# Patient Record
Sex: MALE | Race: WHITE | NOT HISPANIC OR LATINO | Employment: FULL TIME | URBAN - METROPOLITAN AREA
[De-identification: names, ages, dates, MRNs, and addresses within clinical notes are randomized per-mention and may not be internally consistent; named-entity substitution may affect disease eponyms.]

---

## 2023-12-20 ENCOUNTER — LAB (OUTPATIENT)
Dept: LAB | Facility: LAB | Age: 63
End: 2023-12-20
Payer: COMMERCIAL

## 2023-12-20 DIAGNOSIS — R73.09 OTHER ABNORMAL GLUCOSE: ICD-10-CM

## 2023-12-20 DIAGNOSIS — Z12.5 ENCOUNTER FOR SCREENING FOR MALIGNANT NEOPLASM OF PROSTATE: ICD-10-CM

## 2023-12-20 DIAGNOSIS — I10 ESSENTIAL (PRIMARY) HYPERTENSION: Primary | ICD-10-CM

## 2023-12-20 DIAGNOSIS — E55.9 VITAMIN D DEFICIENCY, UNSPECIFIED: ICD-10-CM

## 2023-12-20 DIAGNOSIS — E78.5 HYPERLIPIDEMIA, UNSPECIFIED: ICD-10-CM

## 2023-12-20 LAB
25(OH)D3 SERPL-MCNC: 18 NG/ML (ref 30–100)
ALBUMIN SERPL BCP-MCNC: 3.9 G/DL (ref 3.4–5)
ALP SERPL-CCNC: 27 U/L (ref 33–136)
ALT SERPL W P-5'-P-CCNC: 18 U/L (ref 10–52)
ANION GAP SERPL CALC-SCNC: 10 MMOL/L (ref 10–20)
AST SERPL W P-5'-P-CCNC: 22 U/L (ref 9–39)
BASOPHILS # BLD AUTO: 0.04 X10*3/UL (ref 0–0.1)
BASOPHILS NFR BLD AUTO: 0.7 %
BILIRUB DIRECT SERPL-MCNC: 0.1 MG/DL (ref 0–0.3)
BILIRUB SERPL-MCNC: 0.5 MG/DL (ref 0–1.2)
BUN SERPL-MCNC: 21 MG/DL (ref 6–23)
CALCIUM SERPL-MCNC: 9.1 MG/DL (ref 8.6–10.3)
CHLORIDE SERPL-SCNC: 101 MMOL/L (ref 98–107)
CO2 SERPL-SCNC: 31 MMOL/L (ref 21–32)
CREAT SERPL-MCNC: 0.97 MG/DL (ref 0.5–1.3)
EOSINOPHIL # BLD AUTO: 0.21 X10*3/UL (ref 0–0.7)
EOSINOPHIL NFR BLD AUTO: 3.7 %
ERYTHROCYTE [DISTWIDTH] IN BLOOD BY AUTOMATED COUNT: 14 % (ref 11.5–14.5)
EST. AVERAGE GLUCOSE BLD GHB EST-MCNC: 120 MG/DL
GFR SERPL CREATININE-BSD FRML MDRD: 88 ML/MIN/1.73M*2
GLUCOSE SERPL-MCNC: 96 MG/DL (ref 74–99)
HBA1C MFR BLD: 5.8 %
HCT VFR BLD AUTO: 45.9 % (ref 41–52)
HGB BLD-MCNC: 15.1 G/DL (ref 13.5–17.5)
IMM GRANULOCYTES # BLD AUTO: 0.01 X10*3/UL (ref 0–0.7)
IMM GRANULOCYTES NFR BLD AUTO: 0.2 % (ref 0–0.9)
LYMPHOCYTES # BLD AUTO: 2.63 X10*3/UL (ref 1.2–4.8)
LYMPHOCYTES NFR BLD AUTO: 46 %
MCH RBC QN AUTO: 29.4 PG (ref 26–34)
MCHC RBC AUTO-ENTMCNC: 32.9 G/DL (ref 32–36)
MCV RBC AUTO: 89 FL (ref 80–100)
MONOCYTES # BLD AUTO: 0.65 X10*3/UL (ref 0.1–1)
MONOCYTES NFR BLD AUTO: 11.4 %
NEUTROPHILS # BLD AUTO: 2.18 X10*3/UL (ref 1.2–7.7)
NEUTROPHILS NFR BLD AUTO: 38 %
NRBC BLD-RTO: 0 /100 WBCS (ref 0–0)
PLATELET # BLD AUTO: 289 X10*3/UL (ref 150–450)
POTASSIUM SERPL-SCNC: 4.3 MMOL/L (ref 3.5–5.3)
PROT SERPL-MCNC: 7.5 G/DL (ref 6.4–8.2)
PSA SERPL-MCNC: 0.3 NG/ML
RBC # BLD AUTO: 5.14 X10*6/UL (ref 4.5–5.9)
SODIUM SERPL-SCNC: 138 MMOL/L (ref 136–145)
T3 SERPL-MCNC: 120 NG/DL (ref 60–200)
T4 SERPL-MCNC: 5.8 UG/DL (ref 4.5–11.1)
TSH SERPL-ACNC: 4.12 MIU/L (ref 0.44–3.98)
WBC # BLD AUTO: 5.7 X10*3/UL (ref 4.4–11.3)

## 2023-12-20 PROCEDURE — 82306 VITAMIN D 25 HYDROXY: CPT

## 2023-12-20 PROCEDURE — 83036 HEMOGLOBIN GLYCOSYLATED A1C: CPT

## 2023-12-20 PROCEDURE — 85025 COMPLETE CBC W/AUTO DIFF WBC: CPT

## 2023-12-20 PROCEDURE — 36415 COLL VENOUS BLD VENIPUNCTURE: CPT

## 2023-12-20 PROCEDURE — 84436 ASSAY OF TOTAL THYROXINE: CPT

## 2023-12-20 PROCEDURE — 80053 COMPREHEN METABOLIC PANEL: CPT

## 2023-12-20 PROCEDURE — 84153 ASSAY OF PSA TOTAL: CPT

## 2023-12-20 PROCEDURE — 82248 BILIRUBIN DIRECT: CPT

## 2023-12-20 PROCEDURE — 84480 ASSAY TRIIODOTHYRONINE (T3): CPT

## 2023-12-20 PROCEDURE — 84443 ASSAY THYROID STIM HORMONE: CPT

## 2023-12-20 NOTE — PROGRESS NOTES
History Of Present Illness  The patient is a 63-year-old male who complains of a 5-month history of right inguinal pain and a 3-month history of a right inguinal lump..  He has discomfort with activity.  No prior hernia.  He cannot recall any injury.  No prior abdominal operation.    Past medical history:  Diverticulitis  Bilateral knee arthroscopy  Ankle operation     Past Medical History  He has no past medical history on file.    Surgical History  He has a past surgical history that includes XR knee; XR ankle; and Cyst Removal.     Allergies  Patient has no known allergies.    Social History  He reports that he has quit smoking. His smoking use included cigarettes. He uses smokeless tobacco. He reports that he does not currently use alcohol. He reports that he does not use drugs.    Family History  No family history on file.    Review of Systems  Review of Systems:  Constitutional:  no fever, no chills, no significant weight change  Neurological: No history of CVA or seizure disorder  Eyes: No pain, no recent visual change  ENT:  No recent hearing loss  Neck: No pain  Cardiovascular: No chest pain, no history of cardiac disease such as myocardial infarction or arrhythmia or congestive heart failure  Pulmonary: No shortness of breath, no history of pulmonary disease such as pneumonia or COPD  Breast: No history of breast disease or breast mass  Gastrointestinal:   no abdominal pain, no nausea or vomiting, no constipation or diarrhea or blood in the stool.  No history of ulcers.  No liver, gallbladder or pancreas disease.  No intestinal disorder.  Genitourinary: No hematuria or dysuria, no kidney disease  Musculoskeletal: Arthritis with pain of the shoulders, hands and legs  Integumentary:  no rash  Psychiatric:  No anxiety or depression  Endocrine:  no history of diabetes  Hematologic/Lymphatic: No easy bruising or bleeding          Last Recorded Vitals  Blood pressure 122/74, pulse 63, temperature 36.7 °C (98  "°F), temperature source Temporal, resp. rate 16, height 1.702 m (5' 7\"), weight 118 kg (259 lb 12.8 oz), SpO2 97 %.    Physical Exam  Constitutional: Well-developed, well-nourished, alert and oriented, no acute distress  Skin: Warm and dry, no lesions, no rashes, no jaundice  HEENT: Normocephalic, atraumatic, EOMI, no scleral icterus, eyes have no redness or swelling or discharge, external inspection of ears and nose is normal, mucous membranes moist  Neck: Soft, nontender, no mass or adenopathy  Cardiac: Regular rate and rhythm, no murmur  Chest: Patent airway, clear to auscultation, normal breath sounds with good chest expansion, no wheezes or rales or rhonchi noted, thorax symmetric  Abdomen: Nondistended, positive bowel sounds, soft, nontender, no mass  Right inguinal hernia extending into the scrotum.  Reducible.  No left inguinal or femoral hernias palpable.  Rectal: Not performed  Extremities: No injury, no lower extremity edema or calf tenderness  Lymphatic: No cervical adenopathy  Musculoskeletal: Range of motion intact, no joint swelling, normal strength  Neurological: Alert and oriented x3, intact sensory and motor function, no obvious focal neurologic abnormalities, normal gait  Psychological: Appropriate mood and behavior    Relevant Results  Labs from December 20, 2023: WBC 5.7, hemoglobin 15.1, platelet 289.  CMP unremarkable.     Assessment/Plan   Diagnoses and all orders for this visit:  Right inguinal hernia  -     Case Request Operating Room: Robotic assisted laparoscopic right inguinal herniorrhaphy with mesh.  Possible bilateral repair.  Possible open operation.; Standing  BMI 40.0-44.9, adult (CMS/HCC)  Other orders  -     Place in outpatient/hospital ambulatory surgery; Standing  -     Vital Signs; Standing  -     Pulse oximetry, spot; Standing  -     Apply sequential compression device; Standing  -     Insert peripheral IV; Standing  -     Saline lock IV; Standing  -     lactated Ringer's " infusion  -     Full code; Standing  -     ceFAZolin (Ancef) 2 g in dextrose 5 % in water (D5W) 50 mL IV      Symptomatic, but reducible right inguinal hernia.  Recommend right inguinal herniorrhaphy with mesh either open or robotic assisted laparoscopic.  I discussed the procedure and risks with the patient. The risks include bleeding, infection, mesh infection, recurrence, injury to surrounding structures such as nerves/blood vessels/intestine/bladder, chronic postop pain, cardiac/pulmonary complications.   If the mesh becomes infected it could require excision.  I discussed the alternative of hernia repair without mesh and observation.  The patient agrees to have robotic assisted laparoscopic right inguinal herniorrhaphy with mesh with possible bilateral repair and possible open operation.  His BMI is over 40.         Sean Vazquez MD

## 2023-12-22 ENCOUNTER — OFFICE VISIT (OUTPATIENT)
Dept: SURGERY | Facility: CLINIC | Age: 63
End: 2023-12-22
Payer: COMMERCIAL

## 2023-12-22 VITALS
BODY MASS INDEX: 40.78 KG/M2 | TEMPERATURE: 98 F | WEIGHT: 259.8 LBS | HEART RATE: 63 BPM | DIASTOLIC BLOOD PRESSURE: 74 MMHG | SYSTOLIC BLOOD PRESSURE: 122 MMHG | RESPIRATION RATE: 16 BRPM | HEIGHT: 67 IN | OXYGEN SATURATION: 97 %

## 2023-12-22 DIAGNOSIS — K40.90 RIGHT INGUINAL HERNIA: Primary | ICD-10-CM

## 2023-12-22 PROCEDURE — 3008F BODY MASS INDEX DOCD: CPT | Performed by: SURGERY

## 2023-12-22 PROCEDURE — 99203 OFFICE O/P NEW LOW 30 MIN: CPT | Performed by: SURGERY

## 2023-12-22 RX ORDER — SODIUM CHLORIDE, SODIUM LACTATE, POTASSIUM CHLORIDE, CALCIUM CHLORIDE 600; 310; 30; 20 MG/100ML; MG/100ML; MG/100ML; MG/100ML
100 INJECTION, SOLUTION INTRAVENOUS CONTINUOUS
Status: CANCELLED | OUTPATIENT
Start: 2023-12-22

## 2023-12-22 NOTE — LETTER
December 22, 2023     Mariusz Leong MD  [address] [facility]  [facility] [address]    Patient: Alejandro Jimenez   YOB: 1960   Date of Visit: 12/22/2023       Dear Dr. Mariusz Leong MD:    Thank you for referring Alejandro iJmenez to me for evaluation. Below are my notes for this consultation.  If you have questions, please do not hesitate to call me. I look forward to following your patient along with you.       Sincerely,     Sean Vazquez MD      CC: No Recipients  ______________________________________________________________________________________    History Of Present Illness  The patient is a 63-year-old male who complains of a 5-month history of right inguinal pain and a 3-month history of a right inguinal lump..  He has discomfort with activity.  No prior hernia.  He cannot recall any injury.  No prior abdominal operation.    Past medical history:  Diverticulitis  Bilateral knee arthroscopy  Ankle operation     Past Medical History  He has no past medical history on file.    Surgical History  He has a past surgical history that includes XR knee; XR ankle; and Cyst Removal.     Allergies  Patient has no known allergies.    Social History  He reports that he has quit smoking. His smoking use included cigarettes. He uses smokeless tobacco. He reports that he does not currently use alcohol. He reports that he does not use drugs.    Family History  No family history on file.    Review of Systems  Review of Systems:  Constitutional:  no fever, no chills, no significant weight change  Neurological: No history of CVA or seizure disorder  Eyes: No pain, no recent visual change  ENT:  No recent hearing loss  Neck: No pain  Cardiovascular: No chest pain, no history of cardiac disease such as myocardial infarction or arrhythmia or congestive heart failure  Pulmonary: No shortness of breath, no history of pulmonary disease such as pneumonia or COPD  Breast: No history of breast disease or  "breast mass  Gastrointestinal:   no abdominal pain, no nausea or vomiting, no constipation or diarrhea or blood in the stool.  No history of ulcers.  No liver, gallbladder or pancreas disease.  No intestinal disorder.  Genitourinary: No hematuria or dysuria, no kidney disease  Musculoskeletal: Arthritis with pain of the shoulders, hands and legs  Integumentary:  no rash  Psychiatric:  No anxiety or depression  Endocrine:  no history of diabetes  Hematologic/Lymphatic: No easy bruising or bleeding          Last Recorded Vitals  Blood pressure 122/74, pulse 63, temperature 36.7 °C (98 °F), temperature source Temporal, resp. rate 16, height 1.702 m (5' 7\"), weight 118 kg (259 lb 12.8 oz), SpO2 97 %.    Physical Exam  Constitutional: Well-developed, well-nourished, alert and oriented, no acute distress  Skin: Warm and dry, no lesions, no rashes, no jaundice  HEENT: Normocephalic, atraumatic, EOMI, no scleral icterus, eyes have no redness or swelling or discharge, external inspection of ears and nose is normal, mucous membranes moist  Neck: Soft, nontender, no mass or adenopathy  Cardiac: Regular rate and rhythm, no murmur  Chest: Patent airway, clear to auscultation, normal breath sounds with good chest expansion, no wheezes or rales or rhonchi noted, thorax symmetric  Abdomen: Nondistended, positive bowel sounds, soft, nontender, no mass  Right inguinal hernia extending into the scrotum.  Reducible.  No left inguinal or femoral hernias palpable.  Rectal: Not performed  Extremities: No injury, no lower extremity edema or calf tenderness  Lymphatic: No cervical adenopathy  Musculoskeletal: Range of motion intact, no joint swelling, normal strength  Neurological: Alert and oriented x3, intact sensory and motor function, no obvious focal neurologic abnormalities, normal gait  Psychological: Appropriate mood and behavior    Relevant Results  Labs from December 20, 2023: WBC 5.7, hemoglobin 15.1, platelet 289.  CMP " unremarkable.     Assessment/Plan  Diagnoses and all orders for this visit:  Right inguinal hernia  -     Case Request Operating Room: Robotic assisted laparoscopic right inguinal herniorrhaphy with mesh.  Possible bilateral repair.  Possible open operation.; Standing  BMI 40.0-44.9, adult (CMS/[facility])  Other orders  -     Place in outpatient/hospital ambulatory surgery; Standing  -     Vital Signs; Standing  -     Pulse oximetry, spot; Standing  -     Apply sequential compression device; Standing  -     Insert peripheral IV; Standing  -     Saline lock IV; Standing  -     lactated Ringer's infusion  -     Full code; Standing  -     ceFAZolin (Ancef) 2 g in dextrose 5 % in water (D5W) 50 mL IV      Symptomatic, but reducible right inguinal hernia.  Recommend right inguinal herniorrhaphy with mesh either open or robotic assisted laparoscopic.  I discussed the procedure and risks with the patient. The risks include bleeding, infection, mesh infection, recurrence, injury to surrounding structures such as nerves/blood vessels/intestine/bladder, chronic postop pain, cardiac/pulmonary complications.   If the mesh becomes infected it could require excision.  I discussed the alternative of hernia repair without mesh and observation.  The patient agrees to have robotic assisted laparoscopic right inguinal herniorrhaphy with mesh with possible bilateral repair and possible open operation.  His BMI is over 40.         Sean Vazquez MD

## 2024-01-18 NOTE — PROGRESS NOTES
"History Of Present Illness  HPI   December 22, 2023  The patient is a 63-year-old male who complains of a 5-month history of right inguinal pain and a 3-month history of a right inguinal lump.  He has discomfort with activity.  No prior hernia.  He cannot recall any injury.  No prior abdominal operation.    January 19, 2024  The patient reports that the hernia has gotten larger and extends down to the testicle.  He has intermittent right inguinal pain with activity, but not at rest.     Past medical history:  Diverticulitis  Bilateral knee arthroscopy  Ankle operation  Past Medical History  He has no past medical history on file.    Surgical History  He has a past surgical history that includes XR knee; XR ankle; and Cyst Removal.     Allergies  Patient has no known allergies.    Social History  He reports that he has quit smoking. His smoking use included cigarettes. He uses smokeless tobacco. He reports that he does not currently use alcohol. He reports that he does not use drugs.    Family History  No family history on file.    Last Recorded Vitals  Blood pressure 115/76, pulse 65, temperature 37.1 °C (98.8 °F), temperature source Temporal, resp. rate 16, height 1.702 m (5' 7\"), weight 116 kg (255 lb), SpO2 94 %.    Physical Exam  Constitutional: Well-developed, well-nourished, alert and oriented, no acute distress  Skin: Warm and dry, no lesions, no rashes, no jaundice  HEENT: Normocephalic, atraumatic, EOMI, no scleral icterus, eyes have no redness or swelling or discharge, external inspection of ears and nose is normal, mucous membranes moist  Neck: Soft, nontender, no mass or adenopathy  Cardiac: Regular rate and rhythm, no murmur  Chest: Patent airway, clear to auscultation, normal breath sounds with good chest expansion, no wheezes or rales or rhonchi noted, thorax symmetric  Abdomen: Nondistended, positive bowel sounds, soft, nontender, no mass  Right inguinal hernia extending into the scrotum, " reducible.  No left inguinal or femoral hernias palpable.  Rectal: Not performed  Extremities: No injury, no lower extremity edema or calf tenderness  Lymphatic: No cervical adenopathy  Musculoskeletal: Range of motion intact, no joint swelling, normal strength  Neurological: Alert and oriented x3, intact sensory and motor function, no obvious focal neurologic abnormalities, normal gait  Psychological: Appropriate mood and behavior  Patient's wife was present during examination.    Relevant Results  Labs from December 20, 2023: WBC 5.7, hemoglobin 15.1, platelet 289.  CMP unremarkable.      Assessment/Plan   Diagnoses and all orders for this visit:  Right inguinal hernia  BMI 40.0-44.9, adult (CMS/HCC)      Symptomatic, but reducible right inguinal hernia.  Recommend right inguinal herniorrhaphy with mesh either open or robotic assisted laparoscopic.  I again discussed the procedure and risks with the patient. The risks include bleeding, infection, mesh infection, recurrence, injury to surrounding structures such as nerves/blood vessels/intestine/bladder, chronic postop pain, cardiac/pulmonary complications.   If the mesh becomes infected it could require excision.  I have discussed the alternative of hernia repair without mesh and observation.  The patient agrees to have robotic assisted laparoscopic right inguinal herniorrhaphy with mesh with possible bilateral repair and possible open operation.  His BMI is 40.    Sean Vazquez MD

## 2024-01-19 ENCOUNTER — OFFICE VISIT (OUTPATIENT)
Dept: SURGERY | Facility: CLINIC | Age: 64
End: 2024-01-19
Payer: COMMERCIAL

## 2024-01-19 VITALS
SYSTOLIC BLOOD PRESSURE: 115 MMHG | BODY MASS INDEX: 40.02 KG/M2 | HEIGHT: 67 IN | RESPIRATION RATE: 16 BRPM | HEART RATE: 65 BPM | DIASTOLIC BLOOD PRESSURE: 76 MMHG | OXYGEN SATURATION: 94 % | WEIGHT: 255 LBS | TEMPERATURE: 98.8 F

## 2024-01-19 DIAGNOSIS — K40.90 RIGHT INGUINAL HERNIA: Primary | ICD-10-CM

## 2024-01-19 PROCEDURE — 3008F BODY MASS INDEX DOCD: CPT | Performed by: SURGERY

## 2024-01-19 PROCEDURE — 99213 OFFICE O/P EST LOW 20 MIN: CPT | Performed by: SURGERY

## 2024-01-19 NOTE — LETTER
"January 19, 2024     Mariusz Leong MD  [address] [facility]  [facility] [address]    Patient: Alejandro Jimenez   YOB: 1960   Date of Visit: 1/19/2024       Dear Dr. Mariusz Leong MD:    Thank you for referring Alejandro Jimenez to me for evaluation. Below are my notes for this consultation.  If you have questions, please do not hesitate to call me. I look forward to following your patient along with you.       Sincerely,     Sean Vazquez MD      CC: No Recipients  ______________________________________________________________________________________    History Of Present Illness  HPI   December 22, 2023  The patient is a 63-year-old male who complains of a 5-month history of right inguinal pain and a 3-month history of a right inguinal lump.  He has discomfort with activity.  No prior hernia.  He cannot recall any injury.  No prior abdominal operation.    January 19, 2024  The patient reports that the hernia has gotten larger and extends down to the testicle.  He has intermittent right inguinal pain with activity, but not at rest.     Past medical history:  Diverticulitis  Bilateral knee arthroscopy  Ankle operation  Past Medical History  He has no past medical history on file.    Surgical History  He has a past surgical history that includes XR knee; XR ankle; and Cyst Removal.     Allergies  Patient has no known allergies.    Social History  He reports that he has quit smoking. His smoking use included cigarettes. He uses smokeless tobacco. He reports that he does not currently use alcohol. He reports that he does not use drugs.    Family History  No family history on file.    Last Recorded Vitals  Blood pressure 115/76, pulse 65, temperature 37.1 °C (98.8 °F), temperature source Temporal, resp. rate 16, height 1.702 m (5' 7\"), weight 116 kg (255 lb), SpO2 94 %.    Physical Exam  Constitutional: Well-developed, well-nourished, alert and oriented, no acute distress  Skin: Warm and dry, " no lesions, no rashes, no jaundice  HEENT: Normocephalic, atraumatic, EOMI, no scleral icterus, eyes have no redness or swelling or discharge, external inspection of ears and nose is normal, mucous membranes moist  Neck: Soft, nontender, no mass or adenopathy  Cardiac: Regular rate and rhythm, no murmur  Chest: Patent airway, clear to auscultation, normal breath sounds with good chest expansion, no wheezes or rales or rhonchi noted, thorax symmetric  Abdomen: Nondistended, positive bowel sounds, soft, nontender, no mass  Right inguinal hernia extending into the scrotum, reducible.  No left inguinal or femoral hernias palpable.  Rectal: Not performed  Extremities: No injury, no lower extremity edema or calf tenderness  Lymphatic: No cervical adenopathy  Musculoskeletal: Range of motion intact, no joint swelling, normal strength  Neurological: Alert and oriented x3, intact sensory and motor function, no obvious focal neurologic abnormalities, normal gait  Psychological: Appropriate mood and behavior  Patient's wife was present during examination.    Relevant Results  Labs from December 20, 2023: WBC 5.7, hemoglobin 15.1, platelet 289.  CMP unremarkable.      Assessment/Plan  Diagnoses and all orders for this visit:  Right inguinal hernia  BMI 40.0-44.9, adult (CMS/HCC)      Symptomatic, but reducible right inguinal hernia.  Recommend right inguinal herniorrhaphy with mesh either open or robotic assisted laparoscopic.  I again discussed the procedure and risks with the patient. The risks include bleeding, infection, mesh infection, recurrence, injury to surrounding structures such as nerves/blood vessels/intestine/bladder, chronic postop pain, cardiac/pulmonary complications.   If the mesh becomes infected it could require excision.  I have discussed the alternative of hernia repair without mesh and observation.  The patient agrees to have robotic assisted laparoscopic right inguinal herniorrhaphy with mesh with  possible bilateral repair and possible open operation.  His BMI is 40.    Sean Vazquez MD

## 2024-01-24 NOTE — PREPROCEDURE INSTRUCTIONS
No outpatient medications have been marked as taking for the 1/25/24 encounter (Hospital Encounter).        NPO Instructions:  Additional Instructions:     Enter through main entrance of main hospital building, located at [address] [facility] [facility]. Proceed to registration, located in the back right hand corner. You will need your ID and insurance card for registration. Please ensure you have a responsible adult to drive you home.     Shower the morning of or night before your procedure. After you shower avoid lotions, powders, deodorants or anything applied to the skin. If you wear contacts or glasses, wear the glasses. If you do not have glasses, please bring a case for your contacts. You may wear hearing aids and dentures, bring a case for them or we will provide one. Make sure you wear something loose and comfortable. Keep in mind your surgery type and wear something that will accommodate incisions or bandages. Please remove all jewelry.     Nothing to eat or drink after midnight (including coffee, tea, gum etc). You may take medications discussed during phone call with a small sip of water.    For further questions Casey [facility] can be contacted at [phone] between 7AM-3PM.                                                     HSQ, SCDs, OOB postop

## 2024-01-25 ENCOUNTER — HOSPITAL ENCOUNTER (OUTPATIENT)
Dept: CARDIOLOGY | Facility: HOSPITAL | Age: 64
Discharge: HOME | End: 2024-01-25
Payer: COMMERCIAL

## 2024-01-25 ENCOUNTER — ANESTHESIA EVENT (OUTPATIENT)
Dept: OPERATING ROOM | Facility: HOSPITAL | Age: 64
End: 2024-01-25
Payer: COMMERCIAL

## 2024-01-25 ENCOUNTER — APPOINTMENT (OUTPATIENT)
Dept: CARDIOLOGY | Facility: HOSPITAL | Age: 64
End: 2024-01-25
Payer: COMMERCIAL

## 2024-01-25 ENCOUNTER — HOSPITAL ENCOUNTER (OUTPATIENT)
Facility: HOSPITAL | Age: 64
Setting detail: OUTPATIENT SURGERY
Discharge: HOME | End: 2024-01-25
Attending: SURGERY | Admitting: SURGERY
Payer: COMMERCIAL

## 2024-01-25 ENCOUNTER — ANESTHESIA (OUTPATIENT)
Dept: OPERATING ROOM | Facility: HOSPITAL | Age: 64
End: 2024-01-25
Payer: COMMERCIAL

## 2024-01-25 VITALS
RESPIRATION RATE: 16 BRPM | DIASTOLIC BLOOD PRESSURE: 67 MMHG | HEART RATE: 63 BPM | SYSTOLIC BLOOD PRESSURE: 117 MMHG | TEMPERATURE: 97 F | OXYGEN SATURATION: 99 %

## 2024-01-25 DIAGNOSIS — R00.1 BRADYCARDIA: ICD-10-CM

## 2024-01-25 DIAGNOSIS — K40.90 RIGHT INGUINAL HERNIA: Primary | ICD-10-CM

## 2024-01-25 PROBLEM — I10 ESSENTIAL (PRIMARY) HYPERTENSION: Status: ACTIVE | Noted: 2023-12-20

## 2024-01-25 PROCEDURE — 2720000007 HC OR 272 NO HCPCS: Performed by: SURGERY

## 2024-01-25 PROCEDURE — 93005 ELECTROCARDIOGRAM TRACING: CPT

## 2024-01-25 PROCEDURE — 99221 1ST HOSP IP/OBS SF/LOW 40: CPT | Performed by: INTERNAL MEDICINE

## 2024-01-25 PROCEDURE — 7100000002 HC RECOVERY ROOM TIME - EACH INCREMENTAL 1 MINUTE: Performed by: SURGERY

## 2024-01-25 PROCEDURE — 3600000003 HC OR TIME - INITIAL BASE CHARGE - PROCEDURE LEVEL THREE: Performed by: SURGERY

## 2024-01-25 PROCEDURE — 3700000001 HC GENERAL ANESTHESIA TIME - INITIAL BASE CHARGE: Performed by: SURGERY

## 2024-01-25 PROCEDURE — 2500000005 HC RX 250 GENERAL PHARMACY W/O HCPCS

## 2024-01-25 PROCEDURE — 2500000005 HC RX 250 GENERAL PHARMACY W/O HCPCS: Performed by: SURGERY

## 2024-01-25 PROCEDURE — 93242 EXT ECG>48HR<7D RECORDING: CPT

## 2024-01-25 PROCEDURE — 0751T DGTZ GLS MCRSCP SLD LEVEL II: CPT | Mod: TC,SUR,PARLAB | Performed by: SURGERY

## 2024-01-25 PROCEDURE — A49505 PR REPAIR ING HERNIA,5+Y/O,REDUCIBL: Performed by: ANESTHESIOLOGY

## 2024-01-25 PROCEDURE — 88304 TISSUE EXAM BY PATHOLOGIST: CPT | Performed by: PATHOLOGY

## 2024-01-25 PROCEDURE — 7100000001 HC RECOVERY ROOM TIME - INITIAL BASE CHARGE: Performed by: SURGERY

## 2024-01-25 PROCEDURE — 2500000004 HC RX 250 GENERAL PHARMACY W/ HCPCS (ALT 636 FOR OP/ED): Performed by: ANESTHESIOLOGY

## 2024-01-25 PROCEDURE — 2500000004 HC RX 250 GENERAL PHARMACY W/ HCPCS (ALT 636 FOR OP/ED)

## 2024-01-25 PROCEDURE — 3700000002 HC GENERAL ANESTHESIA TIME - EACH INCREMENTAL 1 MINUTE: Performed by: SURGERY

## 2024-01-25 PROCEDURE — 93244 EXT ECG>48HR<7D REV&INTERPJ: CPT | Performed by: INTERNAL MEDICINE

## 2024-01-25 PROCEDURE — 2500000004 HC RX 250 GENERAL PHARMACY W/ HCPCS (ALT 636 FOR OP/ED): Performed by: SURGERY

## 2024-01-25 PROCEDURE — 93010 ELECTROCARDIOGRAM REPORT: CPT | Performed by: INTERNAL MEDICINE

## 2024-01-25 PROCEDURE — 3600000008 HC OR TIME - EACH INCREMENTAL 1 MINUTE - PROCEDURE LEVEL THREE: Performed by: SURGERY

## 2024-01-25 PROCEDURE — C1781 MESH (IMPLANTABLE): HCPCS | Performed by: SURGERY

## 2024-01-25 PROCEDURE — A4217 STERILE WATER/SALINE, 500 ML: HCPCS | Performed by: SURGERY

## 2024-01-25 PROCEDURE — 7100000009 HC PHASE TWO TIME - INITIAL BASE CHARGE: Performed by: SURGERY

## 2024-01-25 PROCEDURE — 88302 TISSUE EXAM BY PATHOLOGIST: CPT | Performed by: PATHOLOGY

## 2024-01-25 PROCEDURE — 2780000003 HC OR 278 NO HCPCS: Performed by: SURGERY

## 2024-01-25 PROCEDURE — 49525 REPAIR ING HERNIA SLIDING: CPT | Performed by: SURGERY

## 2024-01-25 PROCEDURE — 7100000010 HC PHASE TWO TIME - EACH INCREMENTAL 1 MINUTE: Performed by: SURGERY

## 2024-01-25 PROCEDURE — A49505 PR REPAIR ING HERNIA,5+Y/O,REDUCIBL

## 2024-01-25 DEVICE — BARD SOFT MESH
Type: IMPLANTABLE DEVICE | Site: INGUINAL | Status: FUNCTIONAL
Brand: BARD SOFT MESH

## 2024-01-25 RX ORDER — PROPOFOL 10 MG/ML
INJECTION, EMULSION INTRAVENOUS AS NEEDED
Status: DISCONTINUED | OUTPATIENT
Start: 2024-01-25 | End: 2024-01-25

## 2024-01-25 RX ORDER — GLYCOPYRROLATE 0.2 MG/ML
INJECTION INTRAMUSCULAR; INTRAVENOUS AS NEEDED
Status: DISCONTINUED | OUTPATIENT
Start: 2024-01-25 | End: 2024-01-25

## 2024-01-25 RX ORDER — GABAPENTIN 300 MG/1
300 CAPSULE ORAL 3 TIMES DAILY PRN
Qty: 15 CAPSULE | Refills: 0 | Status: SHIPPED | OUTPATIENT
Start: 2024-01-25

## 2024-01-25 RX ORDER — CEFAZOLIN SODIUM 2 G/100ML
2 INJECTION, SOLUTION INTRAVENOUS ONCE
Status: DISCONTINUED | OUTPATIENT
Start: 2024-01-25 | End: 2024-01-25 | Stop reason: HOSPADM

## 2024-01-25 RX ORDER — MEPERIDINE HYDROCHLORIDE 25 MG/ML
12.5 INJECTION INTRAMUSCULAR; INTRAVENOUS; SUBCUTANEOUS EVERY 10 MIN PRN
Status: DISCONTINUED | OUTPATIENT
Start: 2024-01-25 | End: 2024-01-25 | Stop reason: HOSPADM

## 2024-01-25 RX ORDER — ONDANSETRON HYDROCHLORIDE 2 MG/ML
4 INJECTION, SOLUTION INTRAVENOUS ONCE AS NEEDED
Status: DISCONTINUED | OUTPATIENT
Start: 2024-01-25 | End: 2024-01-25 | Stop reason: HOSPADM

## 2024-01-25 RX ORDER — LIDOCAINE HCL/PF 100 MG/5ML
SYRINGE (ML) INTRAVENOUS AS NEEDED
Status: DISCONTINUED | OUTPATIENT
Start: 2024-01-25 | End: 2024-01-25

## 2024-01-25 RX ORDER — PHENYLEPHRINE HCL IN 0.9% NACL 1 MG/10 ML
SYRINGE (ML) INTRAVENOUS AS NEEDED
Status: DISCONTINUED | OUTPATIENT
Start: 2024-01-25 | End: 2024-01-25

## 2024-01-25 RX ORDER — SODIUM CHLORIDE, SODIUM LACTATE, POTASSIUM CHLORIDE, CALCIUM CHLORIDE 600; 310; 30; 20 MG/100ML; MG/100ML; MG/100ML; MG/100ML
100 INJECTION, SOLUTION INTRAVENOUS CONTINUOUS
Status: DISCONTINUED | OUTPATIENT
Start: 2024-01-25 | End: 2024-01-25 | Stop reason: HOSPADM

## 2024-01-25 RX ORDER — NEOSTIGMINE METHYLSULFATE 1 MG/ML
INJECTION, SOLUTION INTRAVENOUS AS NEEDED
Status: DISCONTINUED | OUTPATIENT
Start: 2024-01-25 | End: 2024-01-25

## 2024-01-25 RX ORDER — DEXAMETHASONE SODIUM PHOSPHATE 4 MG/ML
INJECTION, SOLUTION INTRA-ARTICULAR; INTRALESIONAL; INTRAMUSCULAR; INTRAVENOUS; SOFT TISSUE AS NEEDED
Status: DISCONTINUED | OUTPATIENT
Start: 2024-01-25 | End: 2024-01-25

## 2024-01-25 RX ORDER — GABAPENTIN 300 MG/1
300 CAPSULE ORAL ONCE
Status: DISCONTINUED | OUTPATIENT
Start: 2024-01-25 | End: 2024-01-25 | Stop reason: HOSPADM

## 2024-01-25 RX ORDER — OXYCODONE HYDROCHLORIDE 5 MG/1
5 TABLET ORAL EVERY 6 HOURS PRN
Qty: 8 TABLET | Refills: 0 | Status: SHIPPED | OUTPATIENT
Start: 2024-01-25

## 2024-01-25 RX ORDER — BUPIVACAINE HYDROCHLORIDE 5 MG/ML
INJECTION, SOLUTION PERINEURAL AS NEEDED
Status: DISCONTINUED | OUTPATIENT
Start: 2024-01-25 | End: 2024-01-25 | Stop reason: HOSPADM

## 2024-01-25 RX ORDER — ONDANSETRON HYDROCHLORIDE 2 MG/ML
INJECTION, SOLUTION INTRAVENOUS AS NEEDED
Status: DISCONTINUED | OUTPATIENT
Start: 2024-01-25 | End: 2024-01-25

## 2024-01-25 RX ORDER — IPRATROPIUM BROMIDE 0.5 MG/2.5ML
500 SOLUTION RESPIRATORY (INHALATION) ONCE
Status: DISCONTINUED | OUTPATIENT
Start: 2024-01-25 | End: 2024-01-25 | Stop reason: HOSPADM

## 2024-01-25 RX ORDER — ACETAMINOPHEN 325 MG/1
650 TABLET ORAL EVERY 4 HOURS PRN
Status: DISCONTINUED | OUTPATIENT
Start: 2024-01-25 | End: 2024-01-25 | Stop reason: HOSPADM

## 2024-01-25 RX ORDER — ALBUTEROL SULFATE 0.83 MG/ML
2.5 SOLUTION RESPIRATORY (INHALATION) ONCE AS NEEDED
Status: DISCONTINUED | OUTPATIENT
Start: 2024-01-25 | End: 2024-01-25 | Stop reason: HOSPADM

## 2024-01-25 RX ORDER — FENTANYL CITRATE 50 UG/ML
INJECTION, SOLUTION INTRAMUSCULAR; INTRAVENOUS AS NEEDED
Status: DISCONTINUED | OUTPATIENT
Start: 2024-01-25 | End: 2024-01-25

## 2024-01-25 RX ORDER — LIDOCAINE HYDROCHLORIDE 10 MG/ML
0.1 INJECTION INFILTRATION; PERINEURAL ONCE
Status: DISCONTINUED | OUTPATIENT
Start: 2024-01-25 | End: 2024-01-25 | Stop reason: HOSPADM

## 2024-01-25 RX ORDER — DEXAMETHASONE SODIUM PHOSPHATE 10 MG/ML
6 INJECTION INTRAMUSCULAR; INTRAVENOUS ONCE
Status: DISCONTINUED | OUTPATIENT
Start: 2024-01-25 | End: 2024-01-25 | Stop reason: HOSPADM

## 2024-01-25 RX ORDER — ROCURONIUM BROMIDE 10 MG/ML
INJECTION, SOLUTION INTRAVENOUS AS NEEDED
Status: DISCONTINUED | OUTPATIENT
Start: 2024-01-25 | End: 2024-01-25

## 2024-01-25 RX ORDER — MIDAZOLAM HYDROCHLORIDE 1 MG/ML
INJECTION, SOLUTION INTRAMUSCULAR; INTRAVENOUS AS NEEDED
Status: DISCONTINUED | OUTPATIENT
Start: 2024-01-25 | End: 2024-01-25

## 2024-01-25 RX ORDER — CEFAZOLIN 1 G/1
INJECTION, POWDER, FOR SOLUTION INTRAVENOUS AS NEEDED
Status: DISCONTINUED | OUTPATIENT
Start: 2024-01-25 | End: 2024-01-25

## 2024-01-25 RX ORDER — WATER 1 ML/ML
IRRIGANT IRRIGATION AS NEEDED
Status: DISCONTINUED | OUTPATIENT
Start: 2024-01-25 | End: 2024-01-25 | Stop reason: HOSPADM

## 2024-01-25 RX ADMIN — PROPOFOL 50 MG: 10 INJECTION, EMULSION INTRAVENOUS at 10:11

## 2024-01-25 RX ADMIN — Medication 100 MCG: at 08:49

## 2024-01-25 RX ADMIN — SODIUM CHLORIDE, SODIUM LACTATE, POTASSIUM CHLORIDE, AND CALCIUM CHLORIDE: 600; 310; 30; 20 INJECTION, SOLUTION INTRAVENOUS at 10:18

## 2024-01-25 RX ADMIN — FENTANYL CITRATE 50 MCG: 50 INJECTION, SOLUTION INTRAMUSCULAR; INTRAVENOUS at 08:35

## 2024-01-25 RX ADMIN — DEXAMETHASONE SODIUM PHOSPHATE 4 MG: 4 INJECTION, SOLUTION INTRAMUSCULAR; INTRAVENOUS at 07:49

## 2024-01-25 RX ADMIN — ACETAMINOPHEN 650 MG: 325 TABLET ORAL at 12:28

## 2024-01-25 RX ADMIN — FENTANYL CITRATE 50 MCG: 50 INJECTION, SOLUTION INTRAMUSCULAR; INTRAVENOUS at 07:37

## 2024-01-25 RX ADMIN — SODIUM CHLORIDE, SODIUM LACTATE, POTASSIUM CHLORIDE, AND CALCIUM CHLORIDE: 600; 310; 30; 20 INJECTION, SOLUTION INTRAVENOUS at 07:35

## 2024-01-25 RX ADMIN — PROPOFOL 200 MG: 10 INJECTION, EMULSION INTRAVENOUS at 07:37

## 2024-01-25 RX ADMIN — SODIUM CHLORIDE, SODIUM LACTATE, POTASSIUM CHLORIDE, AND CALCIUM CHLORIDE 100 ML/HR: 600; 310; 30; 20 INJECTION, SOLUTION INTRAVENOUS at 06:31

## 2024-01-25 RX ADMIN — PROPOFOL 50 MG: 10 INJECTION, EMULSION INTRAVENOUS at 10:56

## 2024-01-25 RX ADMIN — HYDROMORPHONE HYDROCHLORIDE 0.5 MG: 1 INJECTION, SOLUTION INTRAMUSCULAR; INTRAVENOUS; SUBCUTANEOUS at 12:29

## 2024-01-25 RX ADMIN — FENTANYL CITRATE 50 MCG: 50 INJECTION, SOLUTION INTRAMUSCULAR; INTRAVENOUS at 10:11

## 2024-01-25 RX ADMIN — PROPOFOL 30 MG: 10 INJECTION, EMULSION INTRAVENOUS at 08:41

## 2024-01-25 RX ADMIN — GLYCOPYRROLATE 0.4 MG: 0.2 INJECTION, SOLUTION INTRAMUSCULAR; INTRAVENOUS at 10:31

## 2024-01-25 RX ADMIN — ROCURONIUM BROMIDE 60 MG: 10 INJECTION, SOLUTION INTRAVENOUS at 07:38

## 2024-01-25 RX ADMIN — MIDAZOLAM 2 MG: 1 INJECTION INTRAMUSCULAR; INTRAVENOUS at 07:30

## 2024-01-25 RX ADMIN — ONDANSETRON 4 MG: 2 INJECTION INTRAMUSCULAR; INTRAVENOUS at 10:13

## 2024-01-25 RX ADMIN — CEFAZOLIN 2 G: 330 INJECTION, POWDER, FOR SOLUTION INTRAMUSCULAR; INTRAVENOUS at 07:49

## 2024-01-25 RX ADMIN — NEOSTIGMINE METHYLSULFATE 3 MG: 1 INJECTION INTRAVENOUS at 10:31

## 2024-01-25 RX ADMIN — LIDOCAINE HYDROCHLORIDE 60 MG: 20 INJECTION INTRAVENOUS at 07:37

## 2024-01-25 SDOH — HEALTH STABILITY: MENTAL HEALTH: CURRENT SMOKER: 0

## 2024-01-25 ASSESSMENT — PAIN SCALES - GENERAL
PAIN_LEVEL: 0
PAINLEVEL_OUTOF10: 2
PAINLEVEL_OUTOF10: 9

## 2024-01-25 ASSESSMENT — COLUMBIA-SUICIDE SEVERITY RATING SCALE - C-SSRS
1. IN THE PAST MONTH, HAVE YOU WISHED YOU WERE DEAD OR WISHED YOU COULD GO TO SLEEP AND NOT WAKE UP?: NO
6. HAVE YOU EVER DONE ANYTHING, STARTED TO DO ANYTHING, OR PREPARED TO DO ANYTHING TO END YOUR LIFE?: NO
2. HAVE YOU ACTUALLY HAD ANY THOUGHTS OF KILLING YOURSELF?: NO

## 2024-01-25 ASSESSMENT — PAIN DESCRIPTION - ORIENTATION
ORIENTATION: RIGHT
ORIENTATION: RIGHT

## 2024-01-25 ASSESSMENT — PAIN - FUNCTIONAL ASSESSMENT
PAIN_FUNCTIONAL_ASSESSMENT: 0-10

## 2024-01-25 ASSESSMENT — PAIN DESCRIPTION - LOCATION
LOCATION: ABDOMEN
LOCATION: ABDOMEN

## 2024-01-25 NOTE — ANESTHESIA PREPROCEDURE EVALUATION
Patient: Alejandro Jimenez    Procedure Information       Date/Time: 01/25/24 0730    Procedure: ROBOTIC ASSISTED LAPAROSCOPIC RIGHT POSSIBLE BILATERAL INGUINAL HERNIA REPAIR WITH MESH/ POSSIBLE OPEN (Right) - Robotic assisted laparoscopic right inguinal herniorrhaphy with mesh.  Possible bilateral repair. Possible open operation.    Location: [facility] OR 09 / [facility] OR    Surgeons: Sean Vazquez MD            Relevant Problems   Cardiovascular   (+) Essential (primary) hypertension   (+) Mitral valve disorder       Clinical information reviewed:   Tobacco  Allergies  Meds  Problems  Med Hx  Surg Hx   Fam Hx  Soc   Hx        NPO Detail:  NPO/Void Status  Date of Last Liquid: 01/24/24  Time of Last Liquid: 2300  Date of Last Solid: 01/24/24  Time of Last Solid: 1700         Physical Exam    Airway  Mallampati: III  TM distance: <3 FB  Neck ROM: full     Cardiovascular - normal exam  Rhythm: regular  Rate: normal     Dental - normal exam     Pulmonary - normal exam     Abdominal   (+) obese             Anesthesia Plan    History of general anesthesia?: yes  History of complications of general anesthesia?: no    ASA 3     general     The patient is not a current smoker.  Education provided regarding risk of obstructive sleep apnea.  intravenous induction   Postoperative administration of opioids is intended.  Trial extubation is planned.  Anesthetic plan and risks discussed with patient.  Use of blood products discussed with patient who consented to blood products.    Plan discussed with CRNA.

## 2024-01-25 NOTE — ANESTHESIA PROCEDURE NOTES
Airway  Date/Time: 1/25/2024 7:40 AM  Urgency: elective    Airway not difficult    Staffing  Performed: SRNA   Authorized by: Susan Glass    Performed by: Susan Glass  Patient location during procedure: OR    Indications and Patient Condition  Indications for airway management: anesthesia  Sedation level: deep  Preoxygenated: yes  Patient position: sniffing  MILS maintained throughout  Mask difficulty assessment: 2 - vent by mask + OA or adjuvant +/- NMBA  Planned trial extubation    Final Airway Details  Final airway type: endotracheal airway      Successful airway: ETT     Successful intubation technique: direct laryngoscopy  Facilitating devices/methods: intubating stylet  Blade: Julissa  Blade size: #4  ETT size (mm): 7.5  Cormack-Lehane Classification: grade I - full view of glottis  Placement verified by: chest auscultation and capnometry   Measured from: lips  ETT to lips (cm): 23  Number of attempts at approach: 1  Ventilation between attempts: BVM  Number of other approaches attempted: 1    Other Attempts  Unsuccessful attempted endotracheal techniques: direct laryngoscopy

## 2024-01-25 NOTE — OP NOTE
ATTEMPTED ROBOTIC ASSISTED LAPAROSCOPIC RIGHT NGUINAL HERNIA REPAIR WITH MESH/ CONVERTED TO OPEN (R) Operative Note     Date: 2024  OR Location: [facility]    Name: Alejandro Jimenez, : 1960, Age: 63 y.o., MRN: [id number], Sex: male    Diagnosis  Pre-op Diagnosis     * Right inguinal hernia [K40.90] Post-op Diagnosis     * Right inguinal hernia [K40.90]     Procedures  Open repair of right inguinal hernia with mesh  [address] - [place] [facility] 1ST INGUN HRNA AGE 5 YRS/> REDUCIBLE      Surgeons      * Sean Vazquez - Primary    Resident/Fellow/Other Assistant:  Surgeon(s) and Role:    Procedure Summary  Anesthesia: General  ASA: III  Anesthesia Staff: Anesthesiologist: Kg Gilliam MD  CRNA: DELMER Young-CRNA  SRNA: Susan Glass  Estimated Blood Loss: 20mL  Intra-op Medications:   Administrations occurring from 0730 to 1000 on 24:   Medication Name Total Dose   BUPivacaine HCl (Marcaine) 0.5 % (5 mg/mL) injection 20 mL   sterile water irrigation solution 500 mL   lactated Ringer's infusion Cannot be calculated              Anesthesia Record               Intraprocedure I/O Totals          Intake    lactated Ringer's infusion 1000.00 mL    Total Intake 1000 mL       Output    Urine 125 mL    NG/OG Tube Output 50 mL    Total Output 175 mL       Net    Net Volume 825 mL          Specimen:   ID Type Source Tests Collected by Time   1 : RIGHT INGUINAL HERNIA SAC Tissue HERNIA SAC SURGICAL PATHOLOGY EXAM Sean Vazquez MD 2024 0923   2 : RIGHT INGUINAL HERNIA CORD LIPOMA Tissue LIPOMA SURGICAL PATHOLOGY EXAM Sean Vazquez MD 2024 09        Staff:   Circulator: Lise Wolff RN  Relief Circulator: Yanira Esparza RN  Relief Scrub: Rossana White RN  Scrub Person: Helena Montero         Drains and/or Catheters:   Urethral Catheter Non-latex 16 Fr. (Active)       [REMOVED] NG/OG/Feeding Tube Gastric 16 Fr Center mouth (Removed)       Tourniquet Times:          Implants:  Implants       Type Name Action Serial No.      Surgical Mesh Sling Implant MESH, SOFTMESH 6 X 6 - [id number] Implanted               Findings: Right indirect sliding inguinal hernia with cord lipoma.    Indications: Alejandro Jimenez is an 63 y.o. male who is having surgery for Right inguinal hernia [K40.90].  Symptomatic right inguinal hernia    The patient was seen in the preoperative area. The risks, benefits, complications, treatment options, non-operative alternatives, expected recovery and outcomes were discussed with the patient. The possibilities of reaction to medication, pulmonary aspiration, injury to surrounding structures, bleeding, recurrent infection, the need for additional procedures, failure to diagnose a condition, and creating a complication requiring transfusion or operation were discussed with the patient. The patient concurred with the proposed plan, giving informed consent.  The site of surgery was properly noted/marked if necessary per policy. The patient has been actively warmed in preoperative area. Preoperative antibiotics have been ordered and given within 1 hours of incision. Venous thrombosis prophylaxis have been ordered including bilateral sequential compression devices    Procedure Details: Following informed consent the patient was taken to the operating room and placed in supine position.  A huddle was performed.  The patient was given general anesthetic.  Sequential compression devices were in place and functioning.  The patient received Ancef IV in the operating room.  The patient was scheduled for a robotic assisted laparoscopic repair of the right inguinal hernia.  However, prior to starting the procedure but after the patient was already asleep with general anesthetic the robot malfunctioned.  I discussed this with the patient's family and and gave them the choice of performing an open operation to repair the hernia versus canceling the operation and  rescheduling the robotic repair once the robot was fixed.  They will wish to proceed with the open operation of the hernia today.  During preoperative discussion the patient did agree to have an open repair of the hernia if needed.  The abdomen was prepped and draped in sterile fashion.  A timeout was performed.  Following infiltration of local anesthetic a [right] inguinal incision was made along the skin lines and extended through the subcutaneous tissue.  The patient is obese with a BMI of about 40.  The patient had 7 or 8 cm of subcutaneous fat between the skin incision and fascia.  The external oblique aponeurosis was incised along the lines of its fibers to the external inguinal ring medially.  The ilioinguinal nerve was identified and carefully preserved.  The spermatic cord was freed from surrounding tissue and a Penrose drain placed around it for retraction.  The anteromedial portion of the spermatic cord was dissected.  The patient was found to have [an indirect sliding hernia and a cord lipoma.  The hernia sac was freed from surrounding tissue leaving the most distal portion of the sac in situ.  The proximal portion was dissected to the internal inguinal ring.  Medially some thickened fatty tissue, possibly bladder was noted in the wall of the hernia sac immediately adjacent to the internal inguinal ring.  A 2-0 Vicryl pursestring suture was placed external to this region of thickened fatty tissue.  The excess hernia sac was then amputated.  The cord lipoma was freed from surrounding tissue clamped at its base divided and ligated with a 2-0 Vicryl tie.] .  A 10 x 15 cm diameter polypropylene mesh was cut to the appropriate size and secured circumferentially with 2-0 Prolene suture.  A running suture was used to approximate the mesh to Poupart's ligament beginning medial to the pubic tubercle and extending lateral to the internal inguinal ring.  Laterally the mesh was divided and the 2 legs of the mesh  placed around the spermatic cord at the internal inguinal ring.  Medially and superiorly the mesh was secured to the internal oblique and transversus abdominis with interrupted sutures.  Laterally the 2 legs of the mesh was secured to each other and to Poupart's ligament with interrupted sutures.  The external oblique aponeurosis was reapproximated with a running 3-0 Vicryl suture.  Half percent plain Marcaine was infiltrated.  The subcutaneous tissue was closed with interrupted 3-0 chromic sutures.  Skin was closed with a running 4-0 Vicryl subcuticular stitch.  Dressings were placed and the patient was taken to the recovery room in satisfactory condition having tolerated the procedure well.  Counts were correct.  Complications:  None; patient tolerated the procedure well.    Disposition: PACU - hemodynamically stable.  Condition: stable         Sean Vazquez  Phone Number: [phone]

## 2024-01-25 NOTE — ANESTHESIA POSTPROCEDURE EVALUATION
Patient: Alejandro Jimenez    Procedure Summary       Date: 01/25/24 Room / Location: [facility] OR 09 / [facility]    Anesthesia Start: 0728 Anesthesia Stop: 1101    Procedure: Open repair of right inguinal hernia with mesh (Right) Diagnosis:       Right inguinal hernia      (Right inguinal hernia [K40.90])    Surgeons: Sean Vazquez MD Responsible Provider: Kg Gilliam MD    Anesthesia Type: general ASA Status: 3            Anesthesia Type: general    Vitals Value Taken Time   /76 01/25/24 1101   Temp 36.1 01/25/24 1101   Pulse 67 01/25/24 1101   Resp 16 01/25/24 1101   SpO2 97% 01/25/24 1101       Anesthesia Post Evaluation    Patient location during evaluation: PACU  Patient participation: complete - patient participated  Level of consciousness: awake and alert  Pain score: 0  Pain management: satisfactory to patient  Airway patency: patent  Cardiovascular status: acceptable and hemodynamically stable  Respiratory status: acceptable and face mask  Hydration status: acceptable  Postoperative Nausea and Vomiting: none        There were no known notable events for this encounter.

## 2024-01-25 NOTE — CONSULTS
Inpatient consult to Cardiology  Consult performed by: James C Ramicone, DO  Consult ordered by: Sean Vazquez MD  Reason for consult: bradycardia      History Of Present Illness:      This is a 63-year-old male with no prior cardiac history.  He is being seen today in consultation for evaluation of bradycardia that developed while under anesthesia for hernia repair surgery.  The patient had transient second-degree AV block.  He reports no complaints of palpitations, chest pain, shortness of breath, or any history of syncope.  He has no history of myocardial infarction or congestive heart failure.    Past surgical history: Ankle, knee surgery, hernia repair    Social history: Non-smoker    Family history: Negative for premature CAD or sudden cardiac death      Review of Systems    Other review of systems negative    Social History:  He reports that he has quit smoking. His smoking use included cigarettes. His smokeless tobacco use includes chew. He reports that he does not currently use alcohol. He reports that he does not use drugs.    Family History:  No family history on file.     Allergies:  Patient has no known allergies.    Medications:  Current Facility-Administered Medications   Medication Dose Route Frequency Provider Last Rate Last Admin    acetaminophen (Tylenol) tablet 650 mg  650 mg oral q4h PRN Kg Gilliam MD   650 mg at 01/25/24 1228    albuterol 2.5 mg /3 mL (0.083 %) nebulizer solution 2.5 mg  2.5 mg nebulization Once PRN Kg Gilliam MD        ceFAZolin in dextrose (iso-os) (Ancef) IVPB 2 g  2 g intravenous Once Sean Vazquez MD        dexAMETHasone (PF) (Decadron) injection 6 mg  6 mg intravenous Once Kg Gilliam MD        gabapentin (Neurontin) capsule 300 mg  300 mg oral Once Kg Gilliam MD        HYDROmorphone (Dilaudid) injection 0.5 mg  0.5 mg intravenous q5 min PRN Kg Gilliam MD   0.5 mg at 01/25/24 1229    HYDROmorphone (Dilaudid) injection 0.5 mg   "0.5 mg intravenous q5 min PRN Kg Gilliam MD        ipratropium (Atrovent) 0.02 % nebulizer solution 500 mcg  500 mcg nebulization Once Kg Gilliam MD        lactated Ringer's infusion  100 mL/hr intravenous Continuous Sean Vazquez MD 0 mL/hr at 01/25/24 1017 New Bag at 01/25/24 1018    lactated Ringer's infusion  100 mL/hr intravenous Continuous Kg Gilliam MD        lidocaine (Xylocaine) 10 mg/mL (1 %) injection 1 mg  0.1 mL subcutaneous Once Kg Gilliam MD        meperidine PF (Demerol) injection 12.5 mg  12.5 mg intravenous q10 min PRN Kg Gilliam MD        ondansetron (Zofran) injection 4 mg  4 mg intravenous Once PRN Kg Gilliam MD        ondansetron (Zofran) injection 4 mg  4 mg intravenous Once PRN Kg Gilliam MD             Last Recorded Vitals:  Vitals:    01/25/24 1145 01/25/24 1200 01/25/24 1215 01/25/24 1230   BP:  121/64 117/64 117/67   Pulse:  53 59 63   Resp: 16 16 16    Temp:       TempSrc:       SpO2:  97% 97% 99%       Physical Exam:    General Appearance:  Alert, oriented, no distress  Skin:  Warm and dry  Head and Neck:  No elevation of JVP, no carotid bruits  Cardiac Exam:  Rhythm is regular, S1 and S2 are normal, no murmur S3 or S4  Lungs:  Clear to auscultation  Extremities:  no edema  Neurologic:  No focal deficits  Psychiatric:  Appropriate mood and behavior         Last Labs:  CBC -  No results found for: \"WBC\", \"HGB\", \"HCT\", \"MCV\", \"PLT\"    CMP -  No results found for: \"CALCIUM\", \"PHOS\", \"PROT\", \"ALBUMIN\", \"AST\", \"ALT\", \"ALKPHOS\", \"BILITOT\"    LIPID PANEL -   No results found for: \"CHOL\", \"TRIG\", \"HDL\", \"CHHDL\", \"LDLF\", \"VLDL\", \"NHDL\"    RENAL FUNCTION PANEL -   No results found for: \"GLUCOSE\", \"NA\", \"K\", \"CL\", \"CO2\", \"ANIONGAP\", \"BUN\", \"CREATININE\", \"GFRMALE\", \"CALCIUM\", \"PHOS\", \"ALBUMIN\"     No results found for: \"BNP\", \"HGBA1C\"          No results found for this or any previous visit.    Test Review:  I have personally review the " diagnostic testing:  EKG: Sinus rhythm, no ventricular preexcitation or ischemic changes  Telemetry: Sinus rhythm with second-degree AV block type I while under anesthesia    Assessment/Plan:    1.  Bradycardia: This patient had transient second-degree AV block type I while under anesthesia.  He is currently in sinus rhythm with no conduction system abnormalities.  A outpatient Holter monitor will be applied for further evaluation.  The patient has no complaints of dizziness or near syncope, and no prior cardiac arrhythmia history.  He will be following up with me after completion of the Holter monitor and further treatment recommendations will be made at that time.  He is cleared for discharge from a cardiac perspective.                James C Ramicone, DO

## 2024-01-26 ASSESSMENT — PAIN SCALES - GENERAL: PAINLEVEL_OUTOF10: 0 - NO PAIN

## 2024-01-31 LAB
LABORATORY COMMENT REPORT: NORMAL
PATH REPORT.FINAL DX SPEC: NORMAL
PATH REPORT.GROSS SPEC: NORMAL
PATH REPORT.RELEVANT HX SPEC: NORMAL
PATH REPORT.TOTAL CANCER: NORMAL

## 2024-02-02 LAB
ATRIAL RATE: 53 BPM
ATRIAL RATE: 59 BPM
P AXIS: 27 DEGREES
P AXIS: 47 DEGREES
P OFFSET: 198 MS
P OFFSET: 202 MS
P ONSET: 139 MS
P ONSET: 150 MS
PR INTERVAL: 148 MS
PR INTERVAL: 166 MS
Q ONSET: 222 MS
Q ONSET: 224 MS
QRS COUNT: 10 BEATS
QRS COUNT: 9 BEATS
QRS DURATION: 102 MS
QRS DURATION: 90 MS
QT INTERVAL: 394 MS
QT INTERVAL: 446 MS
QTC CALCULATION(BAZETT): 390 MS
QTC CALCULATION(BAZETT): 418 MS
QTC FREDERICIA: 392 MS
QTC FREDERICIA: 428 MS
R AXIS: 29 DEGREES
R AXIS: 9 DEGREES
T AXIS: 16 DEGREES
T AXIS: 23 DEGREES
T OFFSET: 421 MS
T OFFSET: 445 MS
VENTRICULAR RATE: 53 BPM
VENTRICULAR RATE: 59 BPM

## 2024-02-07 PROBLEM — Z09 POSTOP CHECK: Status: ACTIVE | Noted: 2024-02-07

## 2024-02-07 NOTE — PROGRESS NOTES
"History Of Present Illness  Alejandro Jimenez is a 63 y.o. male status post open repair of a right inguinal hernia with mesh on January 25, 2024.  The patient had been scheduled for a robotic assisted laparoscopic repair of the right inguinal hernia.  However, prior to starting the procedure but after the patient was already anesthetized with a general anesthetic, the robot malfunctioned.  I discussed this with the patient's family and gave them the choice of performing an open operation to repair the hernia versus canceling the operation and rescheduling the robotic repair.  They wished to proceed with open operation that day.  During preoperative discussion the patient had agreed to have open repair of the hernia if needed.  The patient was found to have an indirect sliding hernia and a cord lipoma.  He has no complaints regarding the operation.  He has mild right inguinal discomfort which is improving.  He returned to normal daily activity in 7 days.  He took 5 oxycodone tablets postop.     Last Recorded Vitals  Blood pressure 126/81, pulse 65, temperature 36.4 °C (97.5 °F), temperature source Temporal, resp. rate 16, height 1.702 m (5' 7\"), weight 114 kg (252 lb), SpO2 97 %.    Physical Exam  General: Well-developed, well-nourished, no acute distress, alert and oriented  Wound: Intact, no erythema or signs of infection         Assessment/Plan   Diagnoses and all orders for this visit:  Postop check      Recovering well.  Follow-up as needed.       Sean Vazquez MD  "

## 2024-02-09 ENCOUNTER — OFFICE VISIT (OUTPATIENT)
Dept: SURGERY | Facility: CLINIC | Age: 64
End: 2024-02-09
Payer: COMMERCIAL

## 2024-02-09 VITALS
DIASTOLIC BLOOD PRESSURE: 81 MMHG | WEIGHT: 252 LBS | RESPIRATION RATE: 16 BRPM | BODY MASS INDEX: 39.55 KG/M2 | TEMPERATURE: 97.5 F | HEART RATE: 65 BPM | SYSTOLIC BLOOD PRESSURE: 126 MMHG | OXYGEN SATURATION: 97 % | HEIGHT: 67 IN

## 2024-02-09 DIAGNOSIS — Z09 POSTOP CHECK: Primary | ICD-10-CM

## 2024-02-09 DIAGNOSIS — K40.90 INGUINAL HERNIA WITHOUT OBSTRUCTION OR GANGRENE, RECURRENCE NOT SPECIFIED, UNSPECIFIED LATERALITY: ICD-10-CM

## 2024-02-09 PROCEDURE — 99024 POSTOP FOLLOW-UP VISIT: CPT | Performed by: SURGERY

## 2024-02-09 PROCEDURE — 3074F SYST BP LT 130 MM HG: CPT | Performed by: SURGERY

## 2024-02-09 PROCEDURE — 3008F BODY MASS INDEX DOCD: CPT | Performed by: SURGERY

## 2024-02-09 PROCEDURE — 3079F DIAST BP 80-89 MM HG: CPT | Performed by: SURGERY

## 2024-02-09 NOTE — LETTER
"February 9, 2024     Mariusz Leong MD  [address]  [facility] [address]    Patient: Alejandro Jimenez   YOB: 1960   Date of Visit: 2/9/2024       Dear Dr. Mariusz Leong MD:    Thank you for referring Alejandro Jimenez to me for evaluation. Below are my notes for this consultation.  If you have questions, please do not hesitate to call me. I look forward to following your patient along with you.       Sincerely,     Sean Vazquez MD      CC: No Recipients  ______________________________________________________________________________________    History Of Present Illness  Alejandro Jimenez is a 63 y.o. male status post open repair of a right inguinal hernia with mesh on January 25, 2024.  The patient had been scheduled for a robotic assisted laparoscopic repair of the right inguinal hernia.  However, prior to starting the procedure but after the patient was already anesthetized with a general anesthetic, the robot malfunctioned.  I discussed this with the patient's family and gave them the choice of performing an open operation to repair the hernia versus canceling the operation and rescheduling the robotic repair.  They wished to proceed with open operation that day.  During preoperative discussion the patient had agreed to have open repair of the hernia if needed.  The patient was found to have an indirect sliding hernia and a cord lipoma.  He has no complaints regarding the operation.  He has mild right inguinal discomfort which is improving.  He returned to normal daily activity in 7 days.  He took 5 oxycodone tablets postop.     Last Recorded Vitals  Blood pressure 126/81, pulse 65, temperature 36.4 °C (97.5 °F), temperature source Temporal, resp. rate 16, height 1.702 m (5' 7\"), weight 114 kg (252 lb), SpO2 97 %.    Physical Exam  General: Well-developed, well-nourished, no acute distress, alert and oriented  Wound: Intact, no erythema or signs of infection       "   Assessment/Plan  Diagnoses and all orders for this visit:  Postop check      Recovering well.  Follow-up as needed.       Sean Vazquez MD

## 2024-02-23 PROBLEM — E55.9 VITAMIN D DEFICIENCY: Status: ACTIVE | Noted: 2023-12-20

## 2024-02-23 PROBLEM — E78.5 HYPERLIPIDEMIA: Status: ACTIVE | Noted: 2023-12-20

## 2024-02-23 PROBLEM — E66.9 CLASS 2 OBESITY WITH BODY MASS INDEX (BMI) OF 39.0 TO 39.9 IN ADULT: Status: ACTIVE | Noted: 2024-02-23

## 2024-02-23 PROBLEM — K57.92 DIVERTICULITIS: Status: ACTIVE | Noted: 2024-02-23

## 2024-02-23 PROBLEM — R00.1 BRADYCARDIA: Status: ACTIVE | Noted: 2024-02-23

## 2024-02-23 PROBLEM — H25.10 NUCLEAR SCLEROSIS: Status: ACTIVE | Noted: 2018-06-05

## 2024-02-23 PROBLEM — E66.812 CLASS 2 OBESITY WITH BODY MASS INDEX (BMI) OF 39.0 TO 39.9 IN ADULT: Status: ACTIVE | Noted: 2024-02-23

## 2024-02-28 ENCOUNTER — OFFICE VISIT (OUTPATIENT)
Dept: CARDIOLOGY | Facility: CLINIC | Age: 64
End: 2024-02-28
Payer: COMMERCIAL

## 2024-02-28 VITALS
HEART RATE: 68 BPM | DIASTOLIC BLOOD PRESSURE: 78 MMHG | BODY MASS INDEX: 41.44 KG/M2 | WEIGHT: 264 LBS | OXYGEN SATURATION: 97 % | HEIGHT: 67 IN | SYSTOLIC BLOOD PRESSURE: 128 MMHG

## 2024-02-28 DIAGNOSIS — R00.1 BRADYCARDIA: Primary | ICD-10-CM

## 2024-02-28 PROCEDURE — 3074F SYST BP LT 130 MM HG: CPT | Performed by: INTERNAL MEDICINE

## 2024-02-28 PROCEDURE — 3008F BODY MASS INDEX DOCD: CPT | Performed by: INTERNAL MEDICINE

## 2024-02-28 PROCEDURE — 99213 OFFICE O/P EST LOW 20 MIN: CPT | Performed by: INTERNAL MEDICINE

## 2024-02-28 PROCEDURE — 3078F DIAST BP <80 MM HG: CPT | Performed by: INTERNAL MEDICINE

## 2024-02-28 NOTE — PROGRESS NOTES
"History Of Present Illness:      This is a 63-year-old male with no prior cardiac history.  The patient was seen in consultation at [facility] after he developed bradycardia following hernia repair surgery.  At that time he had second-degree AV block which occurred transiently.  Since hospital discharge he completed a Holter monitor.  No complaints of palpitations, chest pain, shortness of breath, or syncope.    Past surgical history: Ankle surgery, knee surgery, hernia repair  Social history: Non-smoker  Family history: Negative for premature CAD or sudden cardiac death    Review of Systems  Other review of systems negative     Last Recorded Vitals:      1/25/2024    12:15 PM 1/25/2024    12:28 PM 1/25/2024    12:29 PM 1/25/2024    12:30 PM 1/25/2024    12:45 PM 2/9/2024     9:03 AM 2/28/2024     2:39 PM   Vitals   Systolic 117   117  126 128   Diastolic 64   67  81 78   Heart Rate 59   63  65 68   Temp      36.4 °C (97.5 °F)    Resp 16 16 16 16 16 16    Height (in)      1.702 m (5' 7\") 1.702 m (5' 7\")   Weight (lb)      252 264   BMI      39.47 kg/m2 41.35 kg/m2   BSA (m2)      2.32 m2 2.38 m2   Visit Report      Report Report          Allergies:  Patient has no known allergies.    Outpatient Medications:  Current Outpatient Medications   Medication Instructions    gabapentin (NEURONTIN) 300 mg, oral, 3 times daily PRN    oxyCODONE (ROXICODONE) 5 mg, oral, Every 6 hours PRN       Physical Exam:    General Appearance:  Alert, oriented, no distress  Skin:  Warm and dry  Head and Neck:  No elevation of JVP, no carotid bruits  Cardiac Exam:  Rhythm is regular, S1 and S2 are normal, no murmur S3 or S4  Lungs:  Clear to auscultation  Extremities:  no edema  Neurologic:  No focal deficits  Psychiatric:  Appropriate mood and behavior       Cardiology Tests:  I have personally review the diagnostic cardiac testing and my interpretation is as follows:    EKG: Sinus rhythm, no ischemic changes  Holter monitor: " Sinus rhythm, minimum heart rate 47 bpm, maximum heart rate 113 bpm, and average heart rate 68 bpm.      Assessment/Plan   Problem List Items Addressed This Visit             ICD-10-CM    Bradycardia - Primary R00.1     1.  Second-degree AV block: This patient transiently had second-degree AV block while under anesthesia at the time of hernia repair surgery.  A follow-up Holter monitor showed sinus rhythm with no episodes of high-grade AV block and no symptomatic bradycardia.  There is no pacemaker indication at this time.  The patient can follow-up with me as needed from a cardiac perspective.            James C Ramicone, DO

## 2024-02-28 NOTE — ASSESSMENT & PLAN NOTE
1.  Second-degree AV block: This patient transiently had second-degree AV block while under anesthesia at the time of hernia repair surgery.  A follow-up Holter monitor showed sinus rhythm with no episodes of high-grade AV block and no symptomatic bradycardia.  There is no pacemaker indication at this time.  The patient can follow-up with me as needed from a cardiac perspective.

## 2024-02-28 NOTE — LETTER
"February 28, 2024     Mariusz Leong MD  [address] [address]    Patient: Alejandro Jimenez   YOB: 1960   Date of Visit: 2/28/2024       Dear Dr. Mariusz Leong MD:    Thank you for referring Alejandro Jimenez to me for evaluation. Below are my notes for this consultation.  If you have questions, please do not hesitate to call me. I look forward to following your patient along with you.       Sincerely,     James C Ramicone, DO      CC: No Recipients  ______________________________________________________________________________________    History Of Present Illness:      This is a 63-year-old male with no prior cardiac history.  The patient was seen in consultation at [facility] after he developed bradycardia following hernia repair surgery.  At that time he had second-degree AV block which occurred transiently.  Since hospital discharge he completed a Holter monitor.  No complaints of palpitations, chest pain, shortness of breath, or syncope.    Past surgical history: Ankle surgery, knee surgery, hernia repair  Social history: Non-smoker  Family history: Negative for premature CAD or sudden cardiac death    Review of Systems  Other review of systems negative     Last Recorded Vitals:      1/25/2024    12:15 PM 1/25/2024    12:28 PM 1/25/2024    12:29 PM 1/25/2024    12:30 PM 1/25/2024    12:45 PM 2/9/2024     9:03 AM 2/28/2024     2:39 PM   Vitals   Systolic 117   117  126 128   Diastolic 64   67  81 78   Heart Rate 59   63  65 68   Temp      36.4 °C (97.5 °F)    Resp 16 16 16 16 16 16    Height (in)      1.702 m (5' 7\") 1.702 m (5' 7\")   Weight (lb)      252 264   BMI      39.47 kg/m2 41.35 kg/m2   BSA (m2)      2.32 m2 2.38 m2   Visit Report      Report Report          Allergies:  Patient has no known allergies.    Outpatient Medications:  Current Outpatient Medications   Medication Instructions   • gabapentin (NEURONTIN) 300 mg, oral, 3 times daily PRN   • " oxyCODONE (ROXICODONE) 5 mg, oral, Every 6 hours PRN       Physical Exam:    General Appearance:  Alert, oriented, no distress  Skin:  Warm and dry  Head and Neck:  No elevation of JVP, no carotid bruits  Cardiac Exam:  Rhythm is regular, S1 and S2 are normal, no murmur S3 or S4  Lungs:  Clear to auscultation  Extremities:  no edema  Neurologic:  No focal deficits  Psychiatric:  Appropriate mood and behavior       Cardiology Tests:  I have personally review the diagnostic cardiac testing and my interpretation is as follows:    EKG: Sinus rhythm, no ischemic changes  Holter monitor: Sinus rhythm, minimum heart rate 47 bpm, maximum heart rate 113 bpm, and average heart rate 68 bpm.      Assessment/Plan  Problem List Items Addressed This Visit             ICD-10-CM    Bradycardia - Primary R00.1     1.  Second-degree AV block: This patient transiently had second-degree AV block while under anesthesia at the time of hernia repair surgery.  A follow-up Holter monitor showed sinus rhythm with no episodes of high-grade AV block and no symptomatic bradycardia.  There is no pacemaker indication at this time.  The patient can follow-up with me as needed from a cardiac perspective.            James C Ramicone, DO

## 2024-11-15 ENCOUNTER — HOSPITAL ENCOUNTER (OUTPATIENT)
Dept: RADIOLOGY | Facility: HOSPITAL | Age: 64
Discharge: HOME | End: 2024-11-15
Payer: COMMERCIAL

## 2024-11-15 DIAGNOSIS — J18.9 PNEUMONIA, UNSPECIFIED ORGANISM: ICD-10-CM

## 2024-11-15 PROCEDURE — 71046 X-RAY EXAM CHEST 2 VIEWS: CPT

## 2024-12-05 ENCOUNTER — LAB (OUTPATIENT)
Dept: LAB | Facility: LAB | Age: 64
End: 2024-12-05
Payer: COMMERCIAL

## 2024-12-05 ENCOUNTER — HOSPITAL ENCOUNTER (OUTPATIENT)
Dept: RADIOLOGY | Facility: HOSPITAL | Age: 64
Discharge: HOME | End: 2024-12-05
Payer: COMMERCIAL

## 2024-12-05 DIAGNOSIS — E78.5 HYPERLIPIDEMIA, UNSPECIFIED: ICD-10-CM

## 2024-12-05 DIAGNOSIS — I10 ESSENTIAL (PRIMARY) HYPERTENSION: Primary | ICD-10-CM

## 2024-12-05 DIAGNOSIS — R73.9 HYPERGLYCEMIA, UNSPECIFIED: ICD-10-CM

## 2024-12-05 DIAGNOSIS — Z12.5 ENCOUNTER FOR SCREENING FOR MALIGNANT NEOPLASM OF PROSTATE: ICD-10-CM

## 2024-12-05 DIAGNOSIS — J18.9 PNEUMONIA, UNSPECIFIED ORGANISM: ICD-10-CM

## 2024-12-05 DIAGNOSIS — E55.9 VITAMIN D DEFICIENCY, UNSPECIFIED: ICD-10-CM

## 2024-12-05 LAB
25(OH)D3 SERPL-MCNC: 32 NG/ML (ref 30–100)
ALBUMIN SERPL BCP-MCNC: 4 G/DL (ref 3.4–5)
ALP SERPL-CCNC: 31 U/L (ref 33–136)
ALT SERPL W P-5'-P-CCNC: 21 U/L (ref 10–52)
ANION GAP SERPL CALC-SCNC: 8 MMOL/L (ref 10–20)
AST SERPL W P-5'-P-CCNC: 25 U/L (ref 9–39)
BASOPHILS # BLD AUTO: 0.03 X10*3/UL (ref 0–0.1)
BASOPHILS NFR BLD AUTO: 0.6 %
BILIRUB DIRECT SERPL-MCNC: 0.1 MG/DL (ref 0–0.3)
BILIRUB SERPL-MCNC: 0.5 MG/DL (ref 0–1.2)
BUN SERPL-MCNC: 20 MG/DL (ref 6–23)
CALCIUM SERPL-MCNC: 9.1 MG/DL (ref 8.6–10.3)
CHLORIDE SERPL-SCNC: 104 MMOL/L (ref 98–107)
CHOLEST SERPL-MCNC: 184 MG/DL (ref 0–199)
CHOLESTEROL/HDL RATIO: 3.2
CO2 SERPL-SCNC: 29 MMOL/L (ref 21–32)
CREAT SERPL-MCNC: 0.99 MG/DL (ref 0.5–1.3)
EGFRCR SERPLBLD CKD-EPI 2021: 85 ML/MIN/1.73M*2
EOSINOPHIL # BLD AUTO: 0.16 X10*3/UL (ref 0–0.7)
EOSINOPHIL NFR BLD AUTO: 3.4 %
ERYTHROCYTE [DISTWIDTH] IN BLOOD BY AUTOMATED COUNT: 14.2 % (ref 11.5–14.5)
EST. AVERAGE GLUCOSE BLD GHB EST-MCNC: 134 MG/DL
GLUCOSE SERPL-MCNC: 108 MG/DL (ref 74–99)
HBA1C MFR BLD: 6.3 %
HCT VFR BLD AUTO: 46.1 % (ref 41–52)
HDLC SERPL-MCNC: 57.2 MG/DL
HGB BLD-MCNC: 15.5 G/DL (ref 13.5–17.5)
IMM GRANULOCYTES # BLD AUTO: 0.01 X10*3/UL (ref 0–0.7)
IMM GRANULOCYTES NFR BLD AUTO: 0.2 % (ref 0–0.9)
LDLC SERPL CALC-MCNC: 114 MG/DL
LYMPHOCYTES # BLD AUTO: 2 X10*3/UL (ref 1.2–4.8)
LYMPHOCYTES NFR BLD AUTO: 42 %
MCH RBC QN AUTO: 29.9 PG (ref 26–34)
MCHC RBC AUTO-ENTMCNC: 33.6 G/DL (ref 32–36)
MCV RBC AUTO: 89 FL (ref 80–100)
MONOCYTES # BLD AUTO: 0.54 X10*3/UL (ref 0.1–1)
MONOCYTES NFR BLD AUTO: 11.3 %
NEUTROPHILS # BLD AUTO: 2.02 X10*3/UL (ref 1.2–7.7)
NEUTROPHILS NFR BLD AUTO: 42.5 %
NON HDL CHOLESTEROL: 127 MG/DL (ref 0–149)
NRBC BLD-RTO: 0 /100 WBCS (ref 0–0)
PLATELET # BLD AUTO: 301 X10*3/UL (ref 150–450)
POTASSIUM SERPL-SCNC: 4.1 MMOL/L (ref 3.5–5.3)
PROT SERPL-MCNC: 8 G/DL (ref 6.4–8.2)
PSA SERPL-MCNC: 0.22 NG/ML
RBC # BLD AUTO: 5.19 X10*6/UL (ref 4.5–5.9)
SODIUM SERPL-SCNC: 137 MMOL/L (ref 136–145)
T3 SERPL-MCNC: 126 NG/DL (ref 60–200)
T4 SERPL-MCNC: 5.9 UG/DL (ref 4.5–11.1)
TRIGL SERPL-MCNC: 66 MG/DL (ref 0–149)
TSH SERPL-ACNC: 4.27 MIU/L (ref 0.44–3.98)
VLDL: 13 MG/DL (ref 0–40)
WBC # BLD AUTO: 4.8 X10*3/UL (ref 4.4–11.3)

## 2024-12-05 PROCEDURE — 82248 BILIRUBIN DIRECT: CPT

## 2024-12-05 PROCEDURE — 82306 VITAMIN D 25 HYDROXY: CPT

## 2024-12-05 PROCEDURE — 84153 ASSAY OF PSA TOTAL: CPT

## 2024-12-05 PROCEDURE — 84443 ASSAY THYROID STIM HORMONE: CPT

## 2024-12-05 PROCEDURE — 80053 COMPREHEN METABOLIC PANEL: CPT

## 2024-12-05 PROCEDURE — 85025 COMPLETE CBC W/AUTO DIFF WBC: CPT

## 2024-12-05 PROCEDURE — 83036 HEMOGLOBIN GLYCOSYLATED A1C: CPT

## 2024-12-05 PROCEDURE — 80061 LIPID PANEL: CPT

## 2024-12-05 PROCEDURE — 84480 ASSAY TRIIODOTHYRONINE (T3): CPT

## 2024-12-05 PROCEDURE — 71046 X-RAY EXAM CHEST 2 VIEWS: CPT

## 2024-12-05 PROCEDURE — 36415 COLL VENOUS BLD VENIPUNCTURE: CPT

## 2024-12-05 PROCEDURE — 84436 ASSAY OF TOTAL THYROXINE: CPT

## (undated) DEVICE — GOWN, ASTOUND, XL

## (undated) DEVICE — SPONGE, LAP, XRAY DECT, 18IN X 18IN, W/MASTER DMT, STERILE

## (undated) DEVICE — DRAPE, ARM XI

## (undated) DEVICE — DRESSING, TELFA, 3X4

## (undated) DEVICE — HANDLE, PH, FOR YANKAUER SUCTION DEVICE

## (undated) DEVICE — Device

## (undated) DEVICE — SYRINGE, 10 CC, SLIP TIP

## (undated) DEVICE — CAUTERY, PENCIL, PUSH BUTTON, SMOKE EVAC, 70MM

## (undated) DEVICE — CATHETER TRAY, SURESTEP, 16FR, URINE METER W/STATLOCK

## (undated) DEVICE — DRAPE, SHEET, THREE QUARTER, FAN FOLD, 57 X 77 IN

## (undated) DEVICE — SEAL, UNIVERSAL 5-8MM  XI

## (undated) DEVICE — BANDAGE, GAUZE, CONFORMING, KERLIX, 6 PLY, 4.5 IN X 4.1 YD

## (undated) DEVICE — COVER, TIP HOT SHEARS ENDOWRIST

## (undated) DEVICE — DRAIN, PENROSE, 0.25 X 18 IN, LATEX, STERILE

## (undated) DEVICE — DRAPE, SHEET, VI, W/BETADINE

## (undated) DEVICE — GRASPER TIP, MODIFIED RAPTOR, 5MM, DISP

## (undated) DEVICE — SLEEVE, VASO PRESS, CALF GARMENT, MEDIUM, GREEN

## (undated) DEVICE — BANDAGE, COFLEX, 6 X 5 YDS, FOAM TAN, STERILE, LF

## (undated) DEVICE — DRAPE, COLUMN, DAVINCI XI

## (undated) DEVICE — LUBRICANT, ELECTROLUBE, F/ELECTRODE TIPS